# Patient Record
(demographics unavailable — no encounter records)

---

## 2025-05-23 NOTE — DISCUSSION/SUMMARY
[Medication Risks Reviewed] : Medication risks reviewed [de-identified] : Recommend continuing with conservative management at this time including rest, ice, anti-inflammatories, and warm moist heat as needed. They may continue to participate in daily activities within tolerances. Encouraged good home exercise program to increase strength and ROM. Emphasis on abduction exercises of the hip. Due to the radicular symptoms on the Left lower extremity we did order an MRI of the lumbosacral region to rule out HNP, stenosis, facet hypertrophy.  Patient understands that with the results if they are significant she will be referred to orthospine.

## 2025-05-23 NOTE — PHYSICAL EXAM
[LE] : Sensory: Intact in bilateral lower extremities [DP] : dorsalis pedis 2+ and symmetric bilaterally [Normal LLE] : Left Lower Extremity: No scars, rashes, lesions, ulcers, skin intact [Normal Touch] : sensation intact for touch [Normal] : no peripheral adenopathy appreciated [de-identified] : On physical exam of the left hip skin is warm and dry without erythema ecchymosis signs or symptoms of infection superficial or deep, there is a well healed surgical incision.  There is no tenderness noted.  Range of motion is full.  Strength is maintained in all planes.  Sensation is intact throughout the distal lower extremity.  There are 2+ dorsalis pedis pulse.  Leg lengths appear equal measured at the medial malleolus. Negative Rabia's exam [de-identified] : Two radiographs of the pelvis and left hip were performed last visit.  Patient refused x-rays today There are stable interfaces between bone and implant in the femur and acetabulum. There is stable positioning of the femoral and acetabular components. There is no fracture, foreign body, subsidement, osteolysis, or notable effusion. The lesser trochanters of each femur are aligned on Shenton's line. No heterotopic ossification is noted.

## 2025-05-23 NOTE — HISTORY OF PRESENT ILLNESS
[Improving] : improving [2] : a current pain level of 2/10 [0] : a minimum pain level of 0/10 [6] : a maximum pain level of 6/10 [Constant] : ~He/She~ states the symptoms seem to be constant [Lifting] : worsened by lifting [Walking] : worsened by walking [Exercise Regimen] : relieved by exercise regimen [Rest] : relieved by rest [de-identified] : 83-year-old female presents for follow-up of left hip status post left total hip replacement on June 5, 2023.  Overall the postoperative period patient is progressing well.  She states she has minimal pain however she does have numbness which radiates down from the hip down to the lateral aspect of the knee.  She states that this has been present since surgery and has not alleviated.  This is her biggest concern.  She feels as though it is limiting her in terms of her activities.  She is able to exercise and do all of her activities of daily living without any issues.  She did great with physical therapy and they were very pleased with her progress.  She is able to ambulate freely without any assistive devices.  She is exercising 2 times a day without any issues aside from her continued numbness. Denies fevers, chills, chest pain, calf pain, shortness of breath. [Sitting] : not worsened by sitting [Ataxia] : no ataxia [Incontinence] : no incontinence [Loss of Dexterity] : good dexterity [Urinary Ret.] : no urinary retention